# Patient Record
Sex: MALE | Race: WHITE | NOT HISPANIC OR LATINO | Employment: FULL TIME | URBAN - METROPOLITAN AREA
[De-identification: names, ages, dates, MRNs, and addresses within clinical notes are randomized per-mention and may not be internally consistent; named-entity substitution may affect disease eponyms.]

---

## 2024-09-04 ENCOUNTER — HOSPITAL ENCOUNTER (EMERGENCY)
Facility: HOSPITAL | Age: 24
Discharge: HOME | End: 2024-09-04
Payer: COMMERCIAL

## 2024-09-04 DIAGNOSIS — Z01.89 ROUTINE LAB DRAW: Primary | ICD-10-CM
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
## 2024-09-04 PROCEDURE — 99281 EMR DPT VST MAYX REQ PHY/QHP: CPT

## 2024-09-04 NOTE — PROGRESS NOTES
"Patient from [facility] for [facility]lest[facility]. Has paperwork \"no er visit, only lab work\"    Here for nursing blood draw to facilitate care, will follow with [facility].       Praful Ramsay PA-C      "